# Patient Record
Sex: FEMALE | Race: WHITE | NOT HISPANIC OR LATINO | URBAN - METROPOLITAN AREA
[De-identification: names, ages, dates, MRNs, and addresses within clinical notes are randomized per-mention and may not be internally consistent; named-entity substitution may affect disease eponyms.]

---

## 2023-11-30 ENCOUNTER — EMERGENCY (EMERGENCY)
Facility: HOSPITAL | Age: 28
LOS: 0 days | Discharge: HOME | End: 2023-11-30
Attending: EMERGENCY MEDICINE
Payer: SELF-PAY

## 2023-11-30 VITALS
TEMPERATURE: 98 F | HEART RATE: 95 BPM | HEIGHT: 65 IN | WEIGHT: 179.9 LBS | RESPIRATION RATE: 17 BRPM | SYSTOLIC BLOOD PRESSURE: 124 MMHG | OXYGEN SATURATION: 100 % | DIASTOLIC BLOOD PRESSURE: 80 MMHG

## 2023-11-30 DIAGNOSIS — F32.A DEPRESSION, UNSPECIFIED: ICD-10-CM

## 2023-11-30 DIAGNOSIS — F41.9 ANXIETY DISORDER, UNSPECIFIED: ICD-10-CM

## 2023-11-30 DIAGNOSIS — Z76.0 ENCOUNTER FOR ISSUE OF REPEAT PRESCRIPTION: ICD-10-CM

## 2023-11-30 PROCEDURE — 99283 EMERGENCY DEPT VISIT LOW MDM: CPT

## 2023-11-30 PROCEDURE — 99281 EMR DPT VST MAYX REQ PHY/QHP: CPT

## 2023-11-30 NOTE — ED PROVIDER NOTE - OBJECTIVE STATEMENT
28-year-old female history of depression requesting Ativan prescription.  Patient states that she was previously taking Ativan 1 mg 3 times daily.  Her doctor changed her to Klonopin and now she is taking Klonopin 0.5 mg twice daily.  She states that the Klonopin is making her depression worse and is requesting to be switched back to Ativan.  No homicidal or suicidal ideations.

## 2023-11-30 NOTE — ED PROVIDER NOTE - PHYSICAL EXAMINATION
Physical Exam    Vital Signs: I have reviewed the initial vital signs.  Constitutional: well-nourished, appears stated age, no acute distress  Skin: warm, dry  Neuro: AOx3, No focal deficits noted

## 2023-11-30 NOTE — ED PROVIDER NOTE - NSFOLLOWUPCLINICSTOKEN_GEN_ALL_ED_FT
898141:1-3 Days|| ||00\01||False; 936504:1-3 Days|| ||00\01||False; 086221:1-3 Days|| ||00\01||False;

## 2023-11-30 NOTE — ED PROVIDER NOTE - CLINICAL SUMMARY MEDICAL DECISION MAKING FREE TEXT BOX
28-year-old female history of depression/anxiety requesting ativan rx. on klonopin now. no si or hi. in between psychiatrists. no s/s benzo w/d. vss. advised that per dept policy cannot rx ativan. pt understood. In my opinion, based on current evaluation and results, an acute medical or surgical emergency does not appear to be occurring at this time and I feel that the pt is stable for further outpatient work up and/or treatment. Return precautions discussed. refer for outpt MH.

## 2023-11-30 NOTE — ED PROVIDER NOTE - PATIENT PORTAL LINK FT
You can access the FollowMyHealth Patient Portal offered by [facility] by registering at the following website: [email or web address] By joining [employer]’s FollowMyHealth portal, you will also be able to view your health information using other applications (apps) compatible with our system. You can access the FollowMyHealth Patient Portal offered by [facility] by registering at the following website: [email or web address]. By joining [employer]’s FollowMyHealth portal, you will also be able to view your health information using other applications (apps) compatible with our system. You can access the FollowMyHealth Patient Portal offered by [facility] by registering at the following website: [email or web address]. By joining [employer]’s FollowMyHealth portal, you will also be able to view your health information using other applications (apps) compatible with our system.

## 2023-11-30 NOTE — ED PROVIDER NOTE - NSFOLLOWUPCLINICS_GEN_ALL_ED_FT
[facility]  [facility]  [address] [place] [id number]  Phone: [phone]  Fax:   Follow Up Time: 1-3 Days     [facility]  [facility]  [address] [place] [id number]  Phone: [phone]  Fax:   Follow Up Time: 1-3 Days     [facility]  [facility]  [address] [place] [id number]  Phone: [phone]  Fax:   Follow Up Time: 1-3 Days